# Patient Record
Sex: FEMALE | Race: WHITE
[De-identification: names, ages, dates, MRNs, and addresses within clinical notes are randomized per-mention and may not be internally consistent; named-entity substitution may affect disease eponyms.]

---

## 2017-05-10 NOTE — REP
Clinical:  Pain and disability.

 

Technique:  AP, lateral, and coned-down views of the lumbosacral spine.

 

Findings:

Age-related osteopenia and moderate multilevel degenerative changes include

endplate sclerosis with disc space narrowing and hypertrophic facet changes.

Findings are most pronounced at the L5-S1 level as well as the L3-4 level.  No

acute fracture / compression injury or subluxation appreciated.

 

Impression:

Moderate multilevel degenerative changes.

 

 

Signed by

Connor Onofre MD 05/10/2017 02:47 A

## 2019-03-15 NOTE — REP
Clinical:  COPD.

 

Technique:  PA and lateral.

 

Comparison:  10/11/2018.

 

Findings:

Lung fields demonstrate chronic stable interstitial changes.  The mediastinum and

cardiac silhouette are normal.  Skeletal structures are intact.  Evidence of

prior thyroid surgery.

 

Impression:

Chronic changes related to COPD and emphysematous disease.  No obvious acute

process.

 

 

Electronically Signed by

Connor Onofre MD 03/15/2019 02:42 A

## 2019-11-21 NOTE — REP
Chest x-ray:  Two views.

 

History:  Cough.

 

Comparison chest x-ray:  March 14, 2019.

 

Findings:  The lungs are quite hyperinflated consistent with COPD as before.

There is linear fibrosis in the right lower lobe.  There is plate-like

atelectasis versus linear fibrosis in the lingular segment of the left upper

lobe.  This is new when compared with the March 14, 2019 study favoring

atelectasis.  No acute infiltrate is seen.  There is an old healed rib fracture

on the right.  Heart is not enlarged.  There are surgical clips in the soft

tissues of the neck bilaterally unchanged.

 

Impression:

 

Hyperinflation consistent with COPD.  Right base linear fibrosis.  Lingular

plate-like atelectasis versus scarring.  No definite focal infiltrate.

 

 

Electronically Signed by

Larry Luu MD 11/21/2019 05:16 P

## 2019-11-25 NOTE — REP
Clinical:  History of chronic obstructive pulmonary disease with acute

exacerbation.

 

Technique:  PA and lateral.

 

Comparison:  11/21/2019.

 

Findings:

Diffuse chronic COPD and emphysematous changes with scattered fibrosis and

scarring noted.  No focal consolidation.  No effusion.  No pneumothorax.  Cardiac

silhouette is normal.  Skeletal structures intact.

 

Impression:

Advanced COPD/emphysematous changes.  No focal consolidation.

 

 

Electronically Signed by

Connor Onofre MD 11/25/2019 01:49 P

## 2020-06-02 NOTE — REP
REASON FOR EXAM:   Followup.

 

All priors are reviewed, the latest 12/12/2019.

 

The mediastinum and pulmonary concepcion are unchanged. There are no pleural or

pericardial effusions. The imaged osseous structures and imaged upper abdomen are

unchanged.

 

Evaluation of the lung fields again shows lung field hyperexpansion and chronic

scattered asymmetric densities. The opacities seen previously in the left lower

lobe have resolved. There is a new wedge-shaped opacity in the inferior right

middle lobe with air bronchograms. There are no new nodules. There is cylindrical

bronchiectasis, status quo.

 

IMPRESSION:

 

1.  Chronic lung field changes, as described above.

 

2.  Improved asymmetric and patchy left lower lobe opacities.

 

3.  New wedge-shaped right middle lobe opacity, as described above, likely

representing subsegmental atelectasis.

 

4.  Other findings as described above.

 

 

Electronically Signed by

Felix Ponce DO 06/03/2020 11:18 A

## 2022-11-28 ENCOUNTER — HOSPITAL ENCOUNTER (OUTPATIENT)
Dept: HOSPITAL 53 - M PLARAD | Age: 67
End: 2022-11-28
Attending: INTERNAL MEDICINE
Payer: MEDICARE

## 2022-11-28 DIAGNOSIS — R91.1: Primary | ICD-10-CM

## 2022-11-28 PROCEDURE — 78815 PET IMAGE W/CT SKULL-THIGH: CPT

## 2025-02-05 ENCOUNTER — HOSPITAL ENCOUNTER (OUTPATIENT)
Dept: HOSPITAL 53 - M PLAIMG | Age: 70
End: 2025-02-05
Attending: INTERNAL MEDICINE
Payer: MEDICARE

## 2025-02-05 DIAGNOSIS — R05.9: Primary | ICD-10-CM
